# Patient Record
Sex: FEMALE | Race: BLACK OR AFRICAN AMERICAN | NOT HISPANIC OR LATINO | URBAN - METROPOLITAN AREA
[De-identification: names, ages, dates, MRNs, and addresses within clinical notes are randomized per-mention and may not be internally consistent; named-entity substitution may affect disease eponyms.]

---

## 2021-04-15 ENCOUNTER — APPOINTMENT (RX ONLY)
Dept: URBAN - METROPOLITAN AREA CLINIC 49 | Facility: CLINIC | Age: 36
Setting detail: DERMATOLOGY
End: 2021-04-15

## 2021-04-15 ENCOUNTER — APPOINTMENT (RX ONLY)
Dept: URBAN - METROPOLITAN AREA CLINIC 50 | Facility: CLINIC | Age: 36
Setting detail: DERMATOLOGY
End: 2021-04-15

## 2021-04-15 DIAGNOSIS — L73.9 FOLLICULAR DISORDER, UNSPECIFIED: ICD-10-CM

## 2021-04-15 DIAGNOSIS — L30.9 DERMATITIS, UNSPECIFIED: ICD-10-CM

## 2021-04-15 DIAGNOSIS — L738 OTHER SPECIFIED DISEASES OF HAIR AND HAIR FOLLICLES: ICD-10-CM

## 2021-04-15 DIAGNOSIS — L663 OTHER SPECIFIED DISEASES OF HAIR AND HAIR FOLLICLES: ICD-10-CM

## 2021-04-15 DIAGNOSIS — L81.0 POSTINFLAMMATORY HYPERPIGMENTATION: ICD-10-CM

## 2021-04-15 DIAGNOSIS — B36.0 PITYRIASIS VERSICOLOR: ICD-10-CM

## 2021-04-15 PROBLEM — L02.821 FURUNCLE OF HEAD [ANY PART, EXCEPT FACE]: Status: ACTIVE | Noted: 2021-04-15

## 2021-04-15 PROCEDURE — 99203 OFFICE O/P NEW LOW 30 MIN: CPT

## 2021-04-15 PROCEDURE — ? PRESCRIPTION

## 2021-04-15 PROCEDURE — ? COUNSELING

## 2021-04-15 PROCEDURE — ? TREATMENT REGIMEN

## 2021-04-15 RX ORDER — CLINDAMYCIN PHOSPHATE 10 MG/ML
1 SOLUTION TOPICAL BID
Qty: 60 | Refills: 2 | Status: ERX | COMMUNITY
Start: 2021-04-15

## 2021-04-15 RX ORDER — CLOBETASOL PROPIONATE 0.5 MG/G
1 OINTMENT TOPICAL BID
Qty: 45 | Refills: 0 | Status: ERX | COMMUNITY
Start: 2021-04-15

## 2021-04-15 RX ORDER — KETOCONAZOLE 20 MG/G
1 CREAM TOPICAL BID
Qty: 60 | Refills: 1 | Status: ERX | COMMUNITY
Start: 2021-04-15

## 2021-04-15 RX ADMIN — CLOBETASOL PROPIONATE 1: 0.5 OINTMENT TOPICAL at 00:00

## 2021-04-15 RX ADMIN — KETOCONAZOLE 1: 20 CREAM TOPICAL at 00:00

## 2021-04-15 RX ADMIN — CLINDAMYCIN PHOSPHATE 1: 10 SOLUTION TOPICAL at 00:00

## 2021-04-15 ASSESSMENT — LOCATION SIMPLE DESCRIPTION DERM
LOCATION SIMPLE: CHEST
LOCATION SIMPLE: RIGHT PRETIBIAL REGION
LOCATION SIMPLE: LEFT SCALP
LOCATION SIMPLE: LEFT PRETIBIAL REGION
LOCATION SIMPLE: LEFT PRETIBIAL REGION
LOCATION SIMPLE: LEFT SCALP
LOCATION SIMPLE: RIGHT LOWER BACK
LOCATION SIMPLE: CHEST
LOCATION SIMPLE: RIGHT LOWER BACK
LOCATION SIMPLE: RIGHT PRETIBIAL REGION

## 2021-04-15 ASSESSMENT — LOCATION DETAILED DESCRIPTION DERM
LOCATION DETAILED: MIDDLE STERNUM
LOCATION DETAILED: LEFT PROXIMAL PRETIBIAL REGION
LOCATION DETAILED: LEFT MEDIAL FRONTAL SCALP
LOCATION DETAILED: LEFT MEDIAL FRONTAL SCALP
LOCATION DETAILED: RIGHT SUPERIOR MEDIAL MIDBACK
LOCATION DETAILED: MIDDLE STERNUM
LOCATION DETAILED: RIGHT SUPERIOR MEDIAL MIDBACK
LOCATION DETAILED: RIGHT PROXIMAL PRETIBIAL REGION
LOCATION DETAILED: LEFT PROXIMAL PRETIBIAL REGION
LOCATION DETAILED: RIGHT PROXIMAL PRETIBIAL REGION

## 2021-04-15 ASSESSMENT — LOCATION ZONE DERM
LOCATION ZONE: SCALP
LOCATION ZONE: LEG
LOCATION ZONE: TRUNK
LOCATION ZONE: LEG
LOCATION ZONE: SCALP
LOCATION ZONE: TRUNK

## 2021-04-15 NOTE — PROCEDURE: TREATMENT REGIMEN
Initiate Treatment: Clobetasol 0.05 % topical ointment Bid\\nSig: Apply thin layer to affected areas twice a day x 2 weeks then decrease to once a day
Detail Level: Zone
Samples Given: CeraVe moisturizing cream, CeraVe healing ointment
Initiate Treatment: Clindamycin phosphate 1 % topical solution Bid\\nSig: Apply thin layer to affected areas on scalp twice a day as needed for flares
Initiate Treatment: Ketoconazole 2 % topical cream Bid\\nSig: Apply thin layer to affected areas on chest twice a day

## 2021-04-15 NOTE — HPI: RASH
What Type Of Note Output Would You Prefer (Optional)?: Standard Output
Is The Patient Presenting As Previously Scheduled?: Yes
How Severe Is Your Rash?: moderate
Is This A New Presentation, Or A Follow-Up?: Rash
Additional History: Patient states rash started 2 years ago after her back surgery. The rash is itchy, her doctor prescribed hydrocortisone for the rash but patient has not started. She also tried tea tree oil with witch hazel but it did not improve rash.

## 2021-04-15 NOTE — PROCEDURE: TREATMENT REGIMEN
Detail Level: Zone
Samples Given: CeraVe moisturizing cream, CeraVe healing ointment
Initiate Treatment: Clindamycin phosphate 1 % topical solution Bid\\nSig: Apply thin layer to affected areas on scalp twice a day as needed for flares
Initiate Treatment: Ketoconazole 2 % topical cream Bid\\nSig: Apply thin layer to affected areas on chest twice a day
Initiate Treatment: Clobetasol 0.05 % topical ointment Bid\\nSig: Apply thin layer to affected areas twice a day x 2 weeks then decrease to once a day

## 2022-01-19 ENCOUNTER — APPOINTMENT (RX ONLY)
Dept: URBAN - METROPOLITAN AREA CLINIC 50 | Facility: CLINIC | Age: 37
Setting detail: DERMATOLOGY
End: 2022-01-19

## 2022-01-19 DIAGNOSIS — D22 MELANOCYTIC NEVI: ICD-10-CM

## 2022-01-19 DIAGNOSIS — L43.8 OTHER LICHEN PLANUS: ICD-10-CM

## 2022-01-19 PROBLEM — D23.4 OTHER BENIGN NEOPLASM OF SKIN OF SCALP AND NECK: Status: ACTIVE | Noted: 2022-01-19

## 2022-01-19 PROBLEM — D22.62 MELANOCYTIC NEVI OF LEFT UPPER LIMB, INCLUDING SHOULDER: Status: ACTIVE | Noted: 2022-01-19

## 2022-01-19 PROCEDURE — 99213 OFFICE O/P EST LOW 20 MIN: CPT

## 2022-01-19 PROCEDURE — ? PRESCRIPTION

## 2022-01-19 PROCEDURE — ? TREATMENT REGIMEN

## 2022-01-19 PROCEDURE — ? COUNSELING

## 2022-01-19 RX ORDER — METRONIDAZOLE 500 MG/1
1 TABLET ORAL BID
Qty: 60 | Refills: 3 | Status: ERX | COMMUNITY
Start: 2022-01-19

## 2022-01-19 RX ORDER — CLOBETASOL PROPIONATE 0.5 MG/G
THIN LAYER OINTMENT TOPICAL QD
Qty: 60 | Refills: 2 | Status: ERX | COMMUNITY
Start: 2022-01-19

## 2022-01-19 RX ADMIN — METRONIDAZOLE 1: 500 TABLET ORAL at 00:00

## 2022-01-19 RX ADMIN — CLOBETASOL PROPIONATE THIN LAYER: 0.5 OINTMENT TOPICAL at 00:00

## 2022-01-19 ASSESSMENT — LOCATION SIMPLE DESCRIPTION DERM
LOCATION SIMPLE: LEFT FOREARM
LOCATION SIMPLE: LOWER BACK

## 2022-01-19 ASSESSMENT — LOCATION DETAILED DESCRIPTION DERM
LOCATION DETAILED: SUPERIOR LUMBAR SPINE
LOCATION DETAILED: LEFT VENTRAL DISTAL FOREARM

## 2022-01-19 ASSESSMENT — LOCATION ZONE DERM
LOCATION ZONE: TRUNK
LOCATION ZONE: ARM

## 2022-01-19 NOTE — PROCEDURE: TREATMENT REGIMEN
Initiate Treatment: METRONIDAZOLE  500 mg tablet Bid\\nSig: Take 1 pill twice a day\\n\\nCLOBETASOL 0.05 % topical ointment Qd\\nSig: Apply thin layer to affected lesions on back and arms twice a day until flat or at most for 3 weeks before stopping for 1 week.  Repeat PRN
Detail Level: Zone

## 2022-03-02 ENCOUNTER — RX ONLY (OUTPATIENT)
Age: 37
Setting detail: RX ONLY
End: 2022-03-02

## 2022-03-02 ENCOUNTER — APPOINTMENT (RX ONLY)
Dept: URBAN - METROPOLITAN AREA CLINIC 50 | Facility: CLINIC | Age: 37
Setting detail: DERMATOLOGY
End: 2022-03-02

## 2022-03-02 DIAGNOSIS — L81.0 POSTINFLAMMATORY HYPERPIGMENTATION: ICD-10-CM

## 2022-03-02 DIAGNOSIS — L43.8 OTHER LICHEN PLANUS: ICD-10-CM

## 2022-03-02 PROCEDURE — ? PRESCRIPTION

## 2022-03-02 PROCEDURE — 99213 OFFICE O/P EST LOW 20 MIN: CPT

## 2022-03-02 PROCEDURE — ? COUNSELING

## 2022-03-02 PROCEDURE — ? TREATMENT REGIMEN

## 2022-03-02 RX ORDER — METRONIDAZOLE 500 MG/1
1 TABLET ORAL BID
Qty: 60 | Refills: 3 | Status: ERX

## 2022-03-02 RX ORDER — PHARMACY COMPOUNDING ACCESSORY
THIN LAYER EACH MISCELLANEOUS QHS
Qty: 30 | Refills: 2 | Status: ERX | COMMUNITY
Start: 2022-03-02

## 2022-03-02 RX ADMIN — Medication THIN LAYER: at 00:00

## 2022-03-02 ASSESSMENT — LOCATION SIMPLE DESCRIPTION DERM
LOCATION SIMPLE: RIGHT FOREARM
LOCATION SIMPLE: RIGHT LOWER BACK
LOCATION SIMPLE: LEFT FOREARM
LOCATION SIMPLE: RIGHT PRETIBIAL REGION
LOCATION SIMPLE: LEFT PRETIBIAL REGION

## 2022-03-02 ASSESSMENT — LOCATION DETAILED DESCRIPTION DERM
LOCATION DETAILED: RIGHT INFERIOR MEDIAL MIDBACK
LOCATION DETAILED: RIGHT PROXIMAL PRETIBIAL REGION
LOCATION DETAILED: LEFT VENTRAL PROXIMAL FOREARM
LOCATION DETAILED: RIGHT VENTRAL PROXIMAL FOREARM
LOCATION DETAILED: LEFT PROXIMAL PRETIBIAL REGION

## 2022-03-02 ASSESSMENT — LOCATION ZONE DERM
LOCATION ZONE: TRUNK
LOCATION ZONE: LEG
LOCATION ZONE: ARM

## 2022-03-02 NOTE — PROCEDURE: TREATMENT REGIMEN
Detail Level: Zone
Continue Regimen: METRONIDAZOLE 500 mg tablet Bid\\nSig: Take 1 pill twice a day\\n\\nCLOBETASOL 0.05 % topical ointment Qd\\nSig: Apply thin layer to affected lesions on back and arms twice a day until flat or at most for 3 weeks before\\nstopping for 1 week. Repeat PRN.
Initiate Treatment: PHARMACY COMPOUNDING ACCESSORY QHS\\nSig: Apply small amount to dark spot on back and arms once nightly for three months, then stop for three months.\\n\\nTRETINOIN 0.1%\\nHYDROCORTISONE 2.5%\\nHYDROQUINONE 6%

## 2022-04-14 ENCOUNTER — APPOINTMENT (RX ONLY)
Dept: URBAN - METROPOLITAN AREA CLINIC 50 | Facility: CLINIC | Age: 37
Setting detail: DERMATOLOGY
End: 2022-04-14

## 2022-04-14 DIAGNOSIS — L55.9 SUNBURN, UNSPECIFIED: ICD-10-CM

## 2022-04-14 PROCEDURE — ? PRESCRIPTION

## 2022-04-14 PROCEDURE — ? COUNSELING

## 2022-04-14 PROCEDURE — ? TREATMENT REGIMEN

## 2022-04-14 PROCEDURE — 99212 OFFICE O/P EST SF 10 MIN: CPT

## 2022-04-14 RX ORDER — TRIAMCINOLONE ACETONIDE 1 MG/G
THIN LAYER OINTMENT TOPICAL BID
Qty: 454 | Refills: 1 | Status: ERX | COMMUNITY
Start: 2022-04-14

## 2022-04-14 RX ADMIN — TRIAMCINOLONE ACETONIDE THIN LAYER: 1 OINTMENT TOPICAL at 00:00

## 2022-04-14 ASSESSMENT — LOCATION SIMPLE DESCRIPTION DERM: LOCATION SIMPLE: RIGHT UPPER BACK

## 2022-04-14 ASSESSMENT — LOCATION DETAILED DESCRIPTION DERM: LOCATION DETAILED: RIGHT MEDIAL UPPER BACK

## 2022-04-14 ASSESSMENT — LOCATION ZONE DERM: LOCATION ZONE: TRUNK

## 2022-04-14 NOTE — HPI: RASH
What Type Of Note Output Would You Prefer (Optional)?: Standard Output
Is The Patient Presenting As Previously Scheduled?: Yes
How Severe Is Your Rash?: moderate
Is This A New Presentation, Or A Follow-Up?: Rash
Additional History: Patient states rash

## 2022-04-14 NOTE — PROCEDURE: TREATMENT REGIMEN
Initiate Treatment: TRIAMCINOLONE 0.1% ointment twice daily x two weeks then discontinue x one week. Repeat as needed for flares
Samples Given: La Roche Posay melt in 
Detail Level: Zone